# Patient Record
(demographics unavailable — no encounter records)

---

## 2024-08-24 NOTE — NUR
HS CALLED AT 7:45. PT WAS HAVING A MISCARRIAGE AND REQUESTED PASTORAL CARE. PT
WAS CALM WHEN I WANKEN IN THE ROOM. PT WANTED [facility] TO COME.  I PRAYED
WITH PATIENT AND PROVIDED PASTORAL CARE.  CALLED [facility]ATH[facility] AND LEFT A
MESSAGE. WENT BACK UP TO [facility]. STAYED WITH NURSE WHILE SHE TOOK BIRTH
STATISTICS OF BABY THEN WENT BACK IN TO CHECK ON PT. SINCE FATHER DAYNE WAS
NOT AVAILABLE I OFFERED TO CALL [facility].  PT WANTED ME TO CALL [facility]. CALLED [facility] AND LEFT MESSAGE. WENT WITH NURSE TO BRING BABY TO
PT. PRAYED WITH PT AND FAMILY AGAIN. PROVIDED PASTORAL CARE WHILE FAMILY HELD
BABY THEN TOOK BABY BACK TO NURSE.  CHECKED IN WITH PT AGAIN AND GAVE HER
GRIEF INFO. LET PT KNOW IF SHE NEEDED ME I COULD COME BACK. GAVE STAFF FETAL
DEMISE FORM FOR PT CHART AND LAB.

## 2024-08-27 NOTE — NUR
PT PRESENTED AT [facility] WITH QUESTIONS REGARDING BURIAL.  NIIVA CHOWDHURY REFERRED PT
TO ME.  I ANSWERED HER QUESTIONS, INDICATING I WOULD VERIFY RELEASE FROM
PATHOLOGY AND THEN WOULD COORDINATE WITH FR. BONILLA FOR BURIAL TIME AND WOULD
LET HER KNOW WHAT HAD BEEN ARRANGED.  PT INDICATED UNDERSTANDING AND TOLD ME
BABY WAS TO BE NAMED RAFI.  WILL CONFIRM NAME BEFORE GRAVE MARKER IS ORDERED.